# Patient Record
Sex: FEMALE | Race: WHITE | Employment: OTHER | URBAN - METROPOLITAN AREA
[De-identification: names, ages, dates, MRNs, and addresses within clinical notes are randomized per-mention and may not be internally consistent; named-entity substitution may affect disease eponyms.]

---

## 2018-11-05 ENCOUNTER — HOSPITAL ENCOUNTER (OUTPATIENT)
Dept: MAMMOGRAPHY | Age: 73
Discharge: HOME | End: 2018-11-05
Payer: MEDICARE

## 2018-11-05 DIAGNOSIS — M85.89 OTHER SPECIFIED DISORDERS OF BONE DENSITY AND STRUCTURE, MULTIPLE SITES: ICD-10-CM

## 2018-11-05 PROCEDURE — 77080 DXA BONE DENSITY AXIAL: CPT

## 2020-02-28 ENCOUNTER — APPOINTMENT (OUTPATIENT)
Dept: GENERAL RADIOLOGY | Age: 75
End: 2020-02-28
Attending: EMERGENCY MEDICINE
Payer: MEDICARE

## 2020-02-28 ENCOUNTER — APPOINTMENT (OUTPATIENT)
Dept: CT IMAGING | Age: 75
End: 2020-02-28
Attending: EMERGENCY MEDICINE
Payer: MEDICARE

## 2020-02-28 ENCOUNTER — HOSPITAL ENCOUNTER (EMERGENCY)
Age: 75
Discharge: HOME | End: 2020-02-28
Attending: EMERGENCY MEDICINE | Admitting: EMERGENCY MEDICINE
Payer: MEDICARE

## 2020-02-28 ENCOUNTER — APPOINTMENT (OUTPATIENT)
Dept: VASCULAR SURGERY | Age: 75
End: 2020-02-28
Attending: EMERGENCY MEDICINE
Payer: MEDICARE

## 2020-02-28 VITALS
TEMPERATURE: 97.8 F | HEART RATE: 66 BPM | RESPIRATION RATE: 16 BRPM | SYSTOLIC BLOOD PRESSURE: 119 MMHG | DIASTOLIC BLOOD PRESSURE: 74 MMHG | OXYGEN SATURATION: 98 %

## 2020-02-28 DIAGNOSIS — M79.651 RIGHT THIGH PAIN: Primary | ICD-10-CM

## 2020-02-28 LAB
ALBUMIN SERPL-MCNC: 4 G/DL (ref 3.5–5)
ALBUMIN/GLOB SERPL: 1 {RATIO} (ref 1.1–2.2)
ALP SERPL-CCNC: 83 U/L (ref 45–117)
ALT SERPL-CCNC: 16 U/L (ref 12–78)
ANION GAP SERPL CALC-SCNC: 8 MMOL/L (ref 5–15)
AST SERPL-CCNC: 18 U/L (ref 15–37)
BILIRUB SERPL-MCNC: 0.5 MG/DL (ref 0.2–1)
BUN SERPL-MCNC: 18 MG/DL (ref 6–20)
BUN/CREAT SERPL: 18 (ref 12–20)
CALCIUM SERPL-MCNC: 9.2 MG/DL (ref 8.5–10.1)
CHLORIDE SERPL-SCNC: 108 MMOL/L (ref 97–108)
CO2 SERPL-SCNC: 24 MMOL/L (ref 21–32)
COMMENT, HOLDF: NORMAL
CREAT SERPL-MCNC: 1 MG/DL (ref 0.55–1.02)
GLOBULIN SER CALC-MCNC: 4 G/DL (ref 2–4)
GLUCOSE SERPL-MCNC: 90 MG/DL (ref 65–100)
POTASSIUM SERPL-SCNC: 3.8 MMOL/L (ref 3.5–5.1)
PROT SERPL-MCNC: 8 G/DL (ref 6.4–8.2)
SAMPLES BEING HELD,HOLD: NORMAL
SODIUM SERPL-SCNC: 140 MMOL/L (ref 136–145)

## 2020-02-28 PROCEDURE — 99283 EMERGENCY DEPT VISIT LOW MDM: CPT

## 2020-02-28 PROCEDURE — 74011000258 HC RX REV CODE- 258: Performed by: RADIOLOGY

## 2020-02-28 PROCEDURE — 74011636320 HC RX REV CODE- 636/320: Performed by: RADIOLOGY

## 2020-02-28 PROCEDURE — 93971 EXTREMITY STUDY: CPT

## 2020-02-28 PROCEDURE — 80053 COMPREHEN METABOLIC PANEL: CPT

## 2020-02-28 PROCEDURE — 74011250637 HC RX REV CODE- 250/637: Performed by: EMERGENCY MEDICINE

## 2020-02-28 PROCEDURE — 73502 X-RAY EXAM HIP UNI 2-3 VIEWS: CPT

## 2020-02-28 PROCEDURE — 73701 CT LOWER EXTREMITY W/DYE: CPT

## 2020-02-28 PROCEDURE — 36415 COLL VENOUS BLD VENIPUNCTURE: CPT

## 2020-02-28 RX ORDER — SODIUM CHLORIDE 0.9 % (FLUSH) 0.9 %
10 SYRINGE (ML) INJECTION
Status: COMPLETED | OUTPATIENT
Start: 2020-02-28 | End: 2020-02-28

## 2020-02-28 RX ORDER — KETOROLAC TROMETHAMINE 10 MG/1
10 TABLET, FILM COATED ORAL
Qty: 20 TAB | Refills: 0 | Status: SHIPPED | OUTPATIENT
Start: 2020-02-28

## 2020-02-28 RX ORDER — TRAMADOL HYDROCHLORIDE 50 MG/1
50 TABLET ORAL
Status: COMPLETED | OUTPATIENT
Start: 2020-02-28 | End: 2020-02-28

## 2020-02-28 RX ADMIN — IOPAMIDOL 100 ML: 612 INJECTION, SOLUTION INTRAVENOUS at 13:07

## 2020-02-28 RX ADMIN — Medication 10 ML: at 13:07

## 2020-02-28 RX ADMIN — SODIUM CHLORIDE 100 ML: 900 INJECTION, SOLUTION INTRAVENOUS at 13:07

## 2020-02-28 RX ADMIN — TRAMADOL HYDROCHLORIDE 50 MG: 50 TABLET, FILM COATED ORAL at 09:53

## 2020-02-28 NOTE — ED PROVIDER NOTES
76 y.o. female with past medical history significant for arthritis who presents from home with chief complaint of groin pain. Patient states ~1 week ago she \"felt a tinge\" in her right hip/groin while working in her yard. Patient states her right hip/groin began to hurt however she \"tried to work it out in the gym,\" reporting her pain subsided mildly. Patient states yesterday her right hip/groin pain returned, noting the pain was most severe in her right groin area. Patient states Sherine Nettles was unable to ambulate yesterday or left her right leg\" and \"unable to sleep last night\" secondary to her pain. Patient presents to [facility] ED for evaluation of right groin pain endorsing accompanying right hip pain that is \"less severe. \" Patient denies hx hernia. Patient denies numbness, abdominal pain, back pain, and SOB. There are no other acute medical concerns at this time. Social hx: No Tobacco use, (+) EtOH use, No Illicit drug use. PCP: Oanh Siddiqi MD    Note written by Reggie Styles, as dictated by Zoraida Pacheco MD 6:45 AM     The history is provided by the patient. No  was used.         Past Medical History:   Diagnosis Date    Arthritis     Arthropathy     Cancer ([facility] [address] 2002    Breast    Osteoporosis     Vertigo        Past Surgical History:   Procedure Laterality Date    BREAST SURGERY PROCEDURE UNLISTED      lumpectomy    HX ORTHOPAEDIC           Family History:   Problem Relation Age of Onset    Osteoporosis Mother     Broken Bones Father        Social History     Socioeconomic History    Marital status:      Spouse name: Not on file    Number of children: Not on file    Years of education: Not on file    Highest education level: Not on file   Occupational History    Not on file   Social Needs    Financial resource strain: Not on file    Food insecurity:     Worry: Not on file     Inability: Not on file    Transportation needs:     Medical: Not on file Non-medical: Not on file   Tobacco Use    Smoking status: Never Smoker    Smokeless tobacco: Never Used   Substance and Sexual Activity    Alcohol use: Yes     Frequency: Never     Comment: 1 glass of wine a month    Drug use: Not on file    Sexual activity: Not on file   Lifestyle    Physical activity:     Days per week: Not on file     Minutes per session: Not on file    Stress: Not on file   Relationships    Social connections:     Talks on phone: Not on file     Gets together: Not on file     Attends Mandaeism service: Not on file     Active member of club or organization: Not on file     Attends meetings of clubs or organizations: Not on file     Relationship status: Not on file    Intimate partner violence:     Fear of current or ex partner: Not on file     Emotionally abused: Not on file     Physically abused: Not on file     Forced sexual activity: Not on file   Other Topics Concern    Not on file   Social History Narrative    Not on file         ALLERGIES: Patient has no known allergies. Review of Systems   Constitutional: Negative for activity change, appetite change and fatigue. HENT: Negative for ear pain, facial swelling, sore throat and trouble swallowing. Eyes: Negative for pain, discharge and visual disturbance. Respiratory: Negative for chest tightness, shortness of breath and wheezing. Cardiovascular: Negative for chest pain and palpitations. Gastrointestinal: Negative for abdominal pain, blood in stool, nausea and vomiting. Genitourinary: Negative for difficulty urinating, flank pain and hematuria. Musculoskeletal: Positive for gait problem. Negative for arthralgias, back pain, joint swelling, myalgias and neck pain. Right groin pain, right hip pain. Skin: Negative for color change and rash. Neurological: Negative for dizziness, weakness, numbness and headaches. Hematological: Negative for adenopathy. Does not bruise/bleed easily. Psychiatric/Behavioral: Positive for sleep disturbance. Negative for behavioral problems and confusion. All other systems reviewed and are negative. Vitals:    02/28/20 0547   BP: 119/74   Pulse: 66   Resp: 16   Temp: 97.8 °F (36.6 °C)   SpO2: 98%            Physical Exam  Vitals signs and nursing note reviewed. Constitutional:       General: She is not in acute distress. Appearance: She is well-developed. HENT:      Head: Normocephalic and atraumatic. Nose: Nose normal.   Eyes:      General: No scleral icterus. Conjunctiva/sclera: Conjunctivae normal.      Pupils: Pupils are equal, round, and reactive to light. Neck:      Musculoskeletal: Normal range of motion and neck supple. Thyroid: No thyromegaly. Vascular: No JVD. Trachea: No tracheal deviation. Comments: No carotid bruits noted. Cardiovascular:      Rate and Rhythm: Normal rate and regular rhythm. Heart sounds: Normal heart sounds. No murmur. No friction rub. No gallop. Pulmonary:      Effort: Pulmonary effort is normal. No respiratory distress. Breath sounds: Normal breath sounds. No wheezing or rales. Chest:      Chest wall: No tenderness. Abdominal:      General: Bowel sounds are normal. There is no distension. Palpations: Abdomen is soft. There is no mass. Tenderness: There is no abdominal tenderness. There is no guarding or rebound. Musculoskeletal:      Comments: Discomfort over right greater trochanter. Tenderness to right side of groin. Tenderness over right femoral artery. Lymphadenopathy:      Cervical: No cervical adenopathy. Skin:     General: Skin is warm and dry. Findings: No erythema or rash. Neurological:      Mental Status: She is alert and oriented to person, place, and time. Cranial Nerves: No cranial nerve deficit. Coordination: Coordination normal.      Deep Tendon Reflexes: Reflexes are normal and symmetric.    Psychiatric: Behavior: Behavior normal.         Thought Content: Thought content normal.         Judgment: Judgment normal.     Note written by Carol Nathan, as dictated by Yin Rodriguez MD 6:45 AM      MDM  Number of Diagnoses or Management Options  Right thigh pain: new and requires workup     Amount and/or Complexity of Data Reviewed  Clinical lab tests: reviewed and ordered  Tests in the radiology section of CPT®: ordered and reviewed  Decide to obtain previous medical records or to obtain history from someone other than the patient: yes  Obtain history from someone other than the patient: yes  Review and summarize past medical records: yes  Discuss the patient with other providers: yes  Independent visualization of images, tracings, or specimens: yes    Risk of Complications, Morbidity, and/or Mortality  Presenting problems: moderate  Diagnostic procedures: high  Management options: moderate    Patient Progress  Patient progress: stable         Procedures  PROGRESS NOTE:  7:47 AM  Upon re-examination pt has no inguinal pain and no hernia, when palpating over femoral artery pt has tenderness but when moving laterally over femoral vein pt is very tender. Questionable clot or occulsive process over femoral vein, will order duplex, pt understands and agrees with plan. The Doppler study and plain x-rays of the hip and pelvis appear to be unremarkable. 9:39 AM  Provider spoke to Ortho who will come see pt and recommends CT of pt's hip. Ortho will see the patient following the hip CT. 11:05 AM  Pt reports Tramadol has helped her sx, she is able to get up now and move around. Following the CT, the patient is seen by orthopedics. There is no acute abnormality noted on exam.  He agrees that the presentation appears to be more muscular or connective tissue in origin. He agrees with symptomatic treatment with tramadol that was given here and made her feel better, rest, warm compresses and minimized activity. She will use a walker to help minimize weightbearing and further stress on the right leg. Patient will follow-up with her primary physician or with orthopedics if she continues to have difficulty. She voices understanding and is discharged home in no acute distress with her . The degree of work-up was mandated by the patient's severity of symptoms. This is a patient who is normally very active and does not complain of pain and was in excruciating discomfort, being unable to move her leg without significant pain.

## 2020-02-28 NOTE — DISCHARGE INSTRUCTIONS
Use the medications as directed. Minimize weightbearing. Use a walker to help stabilize yourself and help minimize weight when you are up moving around. Warm moist compresses to the area 4 times a day for 20 to 30 minutes at a time. As you were told by Ortho, make arrangements to see either Dr. Suyapa Ma or Dr. Zion Howard on Monday. Call them sooner if any worsening of symptoms.

## 2020-02-28 NOTE — ED TRIAGE NOTES
Patient arrives from home with CC of right hip pain since Sunday after working in the garden. Pt reports pain has worsened since then. Pain is worse with flexion and sitting. Can only find relief when straightening leg. Pt took Naproxen, used a heating pad and placed a lidocaine patch on hip with no relief.

## 2020-02-28 NOTE — CONSULTS
ORTHO CONSULT NOTE    Subjective:     Date of Consultation:  February 28, 2020  Referring Physician:  Paty Caro is a 76 y.o. female who is being seen for right hip/thigh pain. She believes she may have \"tweeked\" her hip while gardening a week ago. It bothered her throughout the week but was most severe yesterday. She had difficulty walking and was unable to sleep last night due to pain. Primarily she has right groin and anterior thigh pain. Pain is worsened by active hip flexion and better with the hip and knee both extended. Xrays were negative. CT showed small amount of fluid in the right hip joint. Vascular studies were normal. She reports ongoing pain but significant improvement with a dose of Toradol. Denies back pain or radiculopathy type symptoms. Family History   Problem Relation Age of Onset    Osteoporosis Mother     Broken Bones Father       Social History     Tobacco Use    Smoking status: Never Smoker    Smokeless tobacco: Never Used   Substance Use Topics    Alcohol use: Yes     Frequency: Never     Comment: 1 glass of wine a month     Past Medical History:   Diagnosis Date    Arthritis     Arthropathy     Cancer ([facility] [address] 2002    Breast    Osteoporosis     Vertigo       Past Surgical History:   Procedure Laterality Date    BREAST SURGERY PROCEDURE UNLISTED      lumpectomy    HX ORTHOPAEDIC        Prior to Admission medications    Medication Sig Start Date End Date Taking? Authorizing Provider   ketorolac (TORADOL) 10 mg tablet Take 1 Tab by mouth every six (6) hours as needed for Pain. 2/28/20  Yes Letty Rockwell MD     No current facility-administered medications for this encounter. Current Outpatient Medications   Medication Sig    ketorolac (TORADOL) 10 mg tablet Take 1 Tab by mouth every six (6) hours as needed for Pain. No Known Allergies     Review of Systems:  A comprehensive review of systems was negative except for that written in the HPI.     Mental Status: no dementia    Objective:     No data found. Temp (24hrs), Av.8 °F (36.6 °C), Min:97.8 °F (36.6 °C), Max:97.8 °F (36.6 °C)      PHYSICAL EXAM:   General: 66yo female, pleasant/cooperative, obviously uncomfortable, spouse at bedside  CNS: alert/oriented, normal mood  Skin: no rash or abnormalities right LE  Extremities: sitting on edge of bed with right leg straight, unable to actively flex hip without severe pain, exquisitely tender over proximal quadriceps, no palpable deformity, unable to tolerate passive right hip ROM  Vascular: strong pedal pulse  Neurologic: 5/5 strength with flex/ext of knee and ankle, unable to flex hip due to pain, sensation intact throughout LE    Imaging Review:   XR HIP RT W OR WO PELV 2-3 VWS 2020 07:21  INDICATION: Trauma  EXAMINATION:  HIP RADIOGRAPHS  COMPARISON: None  FINDINGS:  Single AP view of the pelvis and lateral view of the right hip demonstrate no  acute fracture or dislocation. The pubic symphysis is intact. The sacroiliac  joints are intact. IMPRESSION:  No acute process. CT LOW EXT RT W CONT 2020 13:31  INDICATION: Severe right thigh and hip pain is increasing since working in the  garden 5 days ago. COMPARISON: No previous cross-sectional imaging of the right thigh or hip. CONTRAST: 100 mL of Isovue-300. TECHNIQUE: Helical CT of the right lower extremity from the iliac crest to the  proximal tibia during uneventful rapid bolus intravenous contrast  administration. Coronal and Sagittal reformats were generated. Images reviewed  in soft tissue and bone windows. CT dose reduction was achieved through the use  of a standardized protocol tailored for this examination and automatic exposure  control for dose modulation. FINDINGS: Bones: Mild osteopenia. No fracture, dislocation, or periosteal  reaction. No aggressive bone lesion. Joint fluid: Small right hip joint effusion. No gas. Articulations: Mild right hip osteoarthritis.  Right hip osteoarthritis is  moderate in the lateral compartment. Tendons: Right hamstring origin calcific tendinosis. Extensor mechanism is  intact at the knee. Muscles: No intramuscular hematoma. No focal atrophy. Soft tissue mass: No enhancing mass. No drainable fluid collection. No acute  process within the pelvis. Image arteries are patent. Limited opacification of  the veins due to timing of contrast bolus. IMPRESSION:   1. Nonspecific small right hip joint effusion. Differential diagnosis is  reactive effusion due to mild osteoarthritis versus inflammatory synovitis due  to recent activity more likely than septic arthritis. Correlate with laboratory  values. 2. No fracture. No CT evidence of osteomyelitis    Labs:   Recent Results (from the past 24 hour(s))   SAMPLES BEING HELD    Collection Time: 02/28/20 11:45 AM   Result Value Ref Range    SAMPLES BEING HELD 1RED 1BLU 1LAV     COMMENT        Add-on orders for these samples will be processed based on acceptable specimen integrity and analyte stability, which may vary by analyte. METABOLIC PANEL, COMPREHENSIVE    Collection Time: 02/28/20 11:45 AM   Result Value Ref Range    Sodium 140 136 - 145 mmol/L    Potassium 3.8 3.5 - 5.1 mmol/L    Chloride 108 97 - 108 mmol/L    CO2 24 21 - 32 mmol/L    Anion gap 8 5 - 15 mmol/L    Glucose 90 65 - 100 mg/dL    BUN 18 6 - 20 MG/DL    Creatinine 1.00 0.55 - 1.02 MG/DL    BUN/Creatinine ratio 18 12 - 20      GFR est AA >60 >60 ml/min/1.73m2    GFR est non-AA 54 (L) >60 ml/min/1.73m2    Calcium 9.2 8.5 - 10.1 MG/DL    Bilirubin, total 0.5 0.2 - 1.0 MG/DL    ALT (SGPT) 16 12 - 78 U/L    AST (SGOT) 18 15 - 37 U/L    Alk.  phosphatase 83 45 - 117 U/L    Protein, total 8.0 6.4 - 8.2 g/dL    Albumin 4.0 3.5 - 5.0 g/dL    Globulin 4.0 2.0 - 4.0 g/dL    A-G Ratio 1.0 (L) 1.1 - 2.2           Impression:     Right thigh quadriceps/hip flexor strain    Plan:     Symptoms and exam most consistent with soft tissue muscle/tendon type injury. Although she is still having noticeable discomfort, she reports significant improvement with one dose of Toradol. We discussed treatment options and further work-up. Suspicion for fracture very low but could get MRI. Suspicion for septic joint is very low but would need aspiration to rule out completely. I gave her the option of admission for MRI and possible hip joint aspiration. She would like to treat this conservatively for now. Recommend continued Toradol along with offloading the hip with a walker. She is in agreement with this plan. She understands that if her symptoms worsen she if to return to ER over the weekend. Otherwise, she can follow-up as outpatient with [facility].     PEG Max

## 2021-03-18 ENCOUNTER — TRANSCRIBE ORDER (OUTPATIENT)
Dept: SCHEDULING | Age: 76
End: 2021-03-18

## 2021-03-18 DIAGNOSIS — Z79.83 LONG TERM (CURRENT) USE OF BISPHOSPHONATES: Primary | ICD-10-CM

## 2021-03-18 DIAGNOSIS — M85.80 OSTEOPENIA: Primary | ICD-10-CM

## 2021-03-29 ENCOUNTER — HOSPITAL ENCOUNTER (OUTPATIENT)
Dept: MAMMOGRAPHY | Age: 76
Discharge: HOME | End: 2021-03-29
Attending: INTERNAL MEDICINE
Payer: MEDICARE

## 2021-03-29 DIAGNOSIS — Z79.83 LONG TERM (CURRENT) USE OF BISPHOSPHONATES: ICD-10-CM

## 2021-03-29 PROCEDURE — 77080 DXA BONE DENSITY AXIAL: CPT

## 2021-06-29 ENCOUNTER — TRANSCRIBE ORDER (OUTPATIENT)
Dept: SCHEDULING | Age: 76
End: 2021-06-29

## 2021-06-29 DIAGNOSIS — M54.9 BACK PAIN: Primary | ICD-10-CM

## 2021-07-14 ENCOUNTER — HOSPITAL ENCOUNTER (OUTPATIENT)
Dept: MRI IMAGING | Age: 76
Discharge: HOME | End: 2021-07-14
Attending: ORTHOPAEDIC SURGERY
Payer: MEDICARE

## 2021-07-14 DIAGNOSIS — M54.9 BACK PAIN: ICD-10-CM

## 2021-07-14 PROCEDURE — 72148 MRI LUMBAR SPINE W/O DYE: CPT

## 2021-07-20 ENCOUNTER — TRANSCRIBE ORDER (OUTPATIENT)
Dept: SCHEDULING | Age: 76
End: 2021-07-20

## 2021-07-20 DIAGNOSIS — M25.561 RIGHT KNEE PAIN: Primary | ICD-10-CM

## 2021-07-26 ENCOUNTER — HOSPITAL ENCOUNTER (OUTPATIENT)
Dept: MRI IMAGING | Age: 76
Discharge: HOME | End: 2021-07-26
Attending: ORTHOPAEDIC SURGERY
Payer: MEDICARE

## 2021-07-26 DIAGNOSIS — M25.561 RIGHT KNEE PAIN: ICD-10-CM

## 2021-07-26 PROCEDURE — 73721 MRI JNT OF LWR EXTRE W/O DYE: CPT

## 2023-05-11 RX ORDER — KETOROLAC TROMETHAMINE 10 MG/1
TABLET, FILM COATED ORAL EVERY 6 HOURS PRN
COMMUNITY
Start: 2020-02-28

## 2024-07-10 ENCOUNTER — HOSPITAL ENCOUNTER (OUTPATIENT)
Facility: HOSPITAL | Age: 79
Discharge: HOME | End: 2024-07-13
Payer: MEDICARE

## 2024-07-10 DIAGNOSIS — R19.00 PALPABLE ABDOMINAL MASS: ICD-10-CM

## 2024-07-10 DIAGNOSIS — I70.0 AORTIC CALCIFICATION (HCC): ICD-10-CM

## 2024-07-10 DIAGNOSIS — Z13.6 SCREENING FOR AAA (ABDOMINAL AORTIC ANEURYSM): ICD-10-CM

## 2024-07-10 DIAGNOSIS — Z87.891 HISTORY OF SMOKING: ICD-10-CM

## 2024-07-10 PROCEDURE — 76706 US ABDL AORTA SCREEN AAA: CPT

## 2024-09-18 ENCOUNTER — HOSPITAL ENCOUNTER (OUTPATIENT)
Facility: HOSPITAL | Age: 79
Discharge: HOME | End: 2024-09-21
Payer: MEDICARE

## 2024-09-18 DIAGNOSIS — M51.36 DDD (DEGENERATIVE DISC DISEASE), LUMBAR: ICD-10-CM

## 2024-09-18 DIAGNOSIS — M99.03 SOMATIC DYSFUNCTION OF LUMBAR REGION: ICD-10-CM

## 2024-09-18 DIAGNOSIS — M54.50 RIGHT LOW BACK PAIN, UNSPECIFIED CHRONICITY, UNSPECIFIED WHETHER SCIATICA PRESENT: ICD-10-CM

## 2024-09-18 DIAGNOSIS — M47.26 OTHER SPONDYLOSIS WITH RADICULOPATHY, LUMBAR REGION: ICD-10-CM

## 2024-09-18 PROCEDURE — 72148 MRI LUMBAR SPINE W/O DYE: CPT

## 2024-11-22 ENCOUNTER — HOSPITAL ENCOUNTER (EMERGENCY)
Facility: HOSPITAL | Age: 79
Discharge: HOME | End: 2024-11-22
Attending: STUDENT IN AN ORGANIZED HEALTH CARE EDUCATION/TRAINING PROGRAM
Payer: MEDICARE

## 2024-11-22 ENCOUNTER — APPOINTMENT (OUTPATIENT)
Facility: HOSPITAL | Age: 79
End: 2024-11-22
Payer: MEDICARE

## 2024-11-22 VITALS
RESPIRATION RATE: 16 BRPM | WEIGHT: 132 LBS | BODY MASS INDEX: 21.21 KG/M2 | HEART RATE: 73 BPM | OXYGEN SATURATION: 98 % | TEMPERATURE: 96.8 F | HEIGHT: 66 IN | DIASTOLIC BLOOD PRESSURE: 70 MMHG | SYSTOLIC BLOOD PRESSURE: 123 MMHG

## 2024-11-22 DIAGNOSIS — M54.2 NECK PAIN: ICD-10-CM

## 2024-11-22 DIAGNOSIS — R42 DIZZINESS: Primary | ICD-10-CM

## 2024-11-22 LAB
ALBUMIN SERPL-MCNC: 3.9 G/DL (ref 3.5–5)
ALBUMIN/GLOB SERPL: 0.9 (ref 1.1–2.2)
ALP SERPL-CCNC: 95 U/L (ref 45–117)
ALT SERPL-CCNC: 17 U/L (ref 12–78)
ANION GAP SERPL CALC-SCNC: 15 MMOL/L (ref 2–12)
AST SERPL-CCNC: 26 U/L (ref 15–37)
BASOPHILS # BLD: 0 K/UL (ref 0–0.1)
BASOPHILS NFR BLD: 0 % (ref 0–1)
BILIRUB SERPL-MCNC: 0.7 MG/DL (ref 0.2–1)
BUN SERPL-MCNC: 11 MG/DL (ref 6–20)
BUN/CREAT SERPL: 11 (ref 12–20)
CALCIUM SERPL-MCNC: 10 MG/DL (ref 8.5–10.1)
CHLORIDE SERPL-SCNC: 98 MMOL/L (ref 97–108)
CO2 SERPL-SCNC: 21 MMOL/L (ref 21–32)
CREAT SERPL-MCNC: 0.98 MG/DL (ref 0.55–1.02)
DIFFERENTIAL METHOD BLD: ABNORMAL
EKG ATRIAL RATE: 79 BPM
EKG DIAGNOSIS: NORMAL
EKG P AXIS: 68 DEGREES
EKG P-R INTERVAL: 154 MS
EKG Q-T INTERVAL: 404 MS
EKG QRS DURATION: 88 MS
EKG QTC CALCULATION (BAZETT): 463 MS
EKG R AXIS: 33 DEGREES
EKG T AXIS: 20 DEGREES
EKG VENTRICULAR RATE: 79 BPM
EOSINOPHIL # BLD: 0 K/UL (ref 0–0.4)
EOSINOPHIL NFR BLD: 0 % (ref 0–7)
ERYTHROCYTE [DISTWIDTH] IN BLOOD BY AUTOMATED COUNT: 12.9 % (ref 11.5–14.5)
GLOBULIN SER CALC-MCNC: 4.2 G/DL (ref 2–4)
GLUCOSE SERPL-MCNC: 117 MG/DL (ref 65–100)
HCT VFR BLD AUTO: 39.7 % (ref 35–47)
HGB BLD-MCNC: 13.7 G/DL (ref 11.5–16)
IMM GRANULOCYTES # BLD AUTO: 0 K/UL (ref 0–0.04)
IMM GRANULOCYTES NFR BLD AUTO: 0 % (ref 0–0.5)
LYMPHOCYTES # BLD: 0.7 K/UL (ref 0.8–3.5)
LYMPHOCYTES NFR BLD: 7 % (ref 12–49)
MCH RBC QN AUTO: 31.6 PG (ref 26–34)
MCHC RBC AUTO-ENTMCNC: 34.5 G/DL (ref 30–36.5)
MCV RBC AUTO: 91.5 FL (ref 80–99)
MONOCYTES # BLD: 0.9 K/UL (ref 0–1)
MONOCYTES NFR BLD: 9 % (ref 5–13)
NEUTS SEG # BLD: 8.2 K/UL (ref 1.8–8)
NEUTS SEG NFR BLD: 84 % (ref 32–75)
NRBC # BLD: 0 K/UL (ref 0–0.01)
NRBC BLD-RTO: 0 PER 100 WBC
PLATELET # BLD AUTO: 297 K/UL (ref 150–400)
PMV BLD AUTO: 10.2 FL (ref 8.9–12.9)
POTASSIUM SERPL-SCNC: 4.3 MMOL/L (ref 3.5–5.1)
PROT SERPL-MCNC: 8.1 G/DL (ref 6.4–8.2)
RBC # BLD AUTO: 4.34 M/UL (ref 3.8–5.2)
RBC MORPH BLD: ABNORMAL
SODIUM SERPL-SCNC: 134 MMOL/L (ref 136–145)
WBC # BLD AUTO: 9.8 K/UL (ref 3.6–11)

## 2024-11-22 PROCEDURE — 99285 EMERGENCY DEPT VISIT HI MDM: CPT

## 2024-11-22 PROCEDURE — 70450 CT HEAD/BRAIN W/O DYE: CPT

## 2024-11-22 PROCEDURE — 6360000004 HC RX CONTRAST MEDICATION: Performed by: STUDENT IN AN ORGANIZED HEALTH CARE EDUCATION/TRAINING PROGRAM

## 2024-11-22 PROCEDURE — 36415 COLL VENOUS BLD VENIPUNCTURE: CPT

## 2024-11-22 PROCEDURE — 6370000000 HC RX 637 (ALT 250 FOR IP)

## 2024-11-22 PROCEDURE — 80053 COMPREHEN METABOLIC PANEL: CPT

## 2024-11-22 PROCEDURE — 93005 ELECTROCARDIOGRAM TRACING: CPT

## 2024-11-22 PROCEDURE — 70498 CT ANGIOGRAPHY NECK: CPT

## 2024-11-22 PROCEDURE — 85025 COMPLETE CBC W/AUTO DIFF WBC: CPT

## 2024-11-22 RX ORDER — METHOCARBAMOL 750 MG/1
750 TABLET, FILM COATED ORAL 4 TIMES DAILY
Qty: 20 TABLET | Refills: 0 | Status: SHIPPED | OUTPATIENT
Start: 2024-11-22 | End: 2024-11-22

## 2024-11-22 RX ORDER — NAPROXEN 500 MG/1
500 TABLET ORAL 2 TIMES DAILY
Qty: 40 TABLET | Refills: 0 | Status: SHIPPED | OUTPATIENT
Start: 2024-11-22 | End: 2024-11-22

## 2024-11-22 RX ORDER — IOPAMIDOL 755 MG/ML
100 INJECTION, SOLUTION INTRAVASCULAR
Status: COMPLETED | OUTPATIENT
Start: 2024-11-22 | End: 2024-11-22

## 2024-11-22 RX ORDER — LIDOCAINE 50 MG/G
1 PATCH TOPICAL DAILY
Qty: 10 PATCH | Refills: 0 | Status: SHIPPED | OUTPATIENT
Start: 2024-11-22 | End: 2024-11-22

## 2024-11-22 RX ORDER — ACETAMINOPHEN 500 MG
1000 TABLET ORAL ONCE
Status: COMPLETED | OUTPATIENT
Start: 2024-11-22 | End: 2024-11-22

## 2024-11-22 RX ORDER — METHOCARBAMOL 500 MG/1
1000 TABLET, FILM COATED ORAL
Status: COMPLETED | OUTPATIENT
Start: 2024-11-22 | End: 2024-11-22

## 2024-11-22 RX ORDER — LIDOCAINE 50 MG/G
1 PATCH TOPICAL DAILY
Qty: 10 PATCH | Refills: 0 | Status: SHIPPED | OUTPATIENT
Start: 2024-11-22 | End: 2024-12-02

## 2024-11-22 RX ORDER — LIDOCAINE 4 G/G
1 PATCH TOPICAL
Status: DISCONTINUED | OUTPATIENT
Start: 2024-11-22 | End: 2024-11-22 | Stop reason: HOSPADM

## 2024-11-22 RX ORDER — METHOCARBAMOL 750 MG/1
750 TABLET, FILM COATED ORAL 4 TIMES DAILY
Qty: 20 TABLET | Refills: 0 | Status: SHIPPED | OUTPATIENT
Start: 2024-11-22 | End: 2024-11-27

## 2024-11-22 RX ORDER — NAPROXEN 500 MG/1
500 TABLET ORAL 2 TIMES DAILY
Qty: 40 TABLET | Refills: 0 | Status: SHIPPED | OUTPATIENT
Start: 2024-11-22

## 2024-11-22 RX ADMIN — IOPAMIDOL 100 ML: 755 INJECTION, SOLUTION INTRAVENOUS at 14:20

## 2024-11-22 RX ADMIN — METHOCARBAMOL 1000 MG: 500 TABLET ORAL at 13:08

## 2024-11-22 RX ADMIN — ACETAMINOPHEN 1000 MG: 500 TABLET ORAL at 13:08

## 2024-11-22 ASSESSMENT — PAIN SCALES - GENERAL
PAINLEVEL_OUTOF10: 2
PAINLEVEL_OUTOF10: 1

## 2024-11-22 ASSESSMENT — PAIN DESCRIPTION - DESCRIPTORS: DESCRIPTORS: SHARP

## 2024-11-22 ASSESSMENT — PAIN DESCRIPTION - ORIENTATION: ORIENTATION: LEFT

## 2024-11-22 ASSESSMENT — PAIN DESCRIPTION - LOCATION: LOCATION: NECK

## 2024-11-22 NOTE — ED PROVIDER NOTES
11/22/24 12:57 PM   Result Value Ref Range    Ventricular Rate 79 BPM    Atrial Rate 79 BPM    P-R Interval 154 ms    QRS Duration 88 ms    Q-T Interval 404 ms    QTc Calculation (Bazett) 463 ms    P Axis 68 degrees    R Axis 33 degrees    T Axis 20 degrees    Diagnosis       Normal sinus rhythm  Nonspecific ST abnormality  Abnormal ECG  When compared with ECG of 14-APR-2009 09:48,  No significant change  Confirmed by Santosh Sanchez ([id number]) on 11/22/2024 2:03:49 PM     CBC with Auto Differential    Collection Time: 11/22/24  1:05 PM   Result Value Ref Range    WBC 9.8 3.6 - 11.0 K/uL    RBC 4.34 3.80 - 5.20 M/uL    Hemoglobin 13.7 11.5 - 16.0 g/dL    Hematocrit 39.7 35.0 - 47.0 %    MCV 91.5 80.0 - 99.0 FL    MCH 31.6 26.0 - 34.0 PG    MCHC 34.5 30.0 - 36.5 g/dL    RDW 12.9 11.5 - 14.5 %    Platelets 297 150 - 400 K/uL    MPV 10.2 8.9 - 12.9 FL    Nucleated RBCs 0.0 0  WBC    nRBC 0.00 0.00 - 0.01 K/uL    Neutrophils % 84 (H) 32 - 75 %    Lymphocytes % 7 (L) 12 - 49 %    Monocytes % 9 5 - 13 %    Eosinophils % 0 0 - 7 %    Basophils % 0 0 - 1 %    Immature Granulocytes % 0 0.0 - 0.5 %    Neutrophils Absolute 8.2 (H) 1.8 - 8.0 K/UL    Lymphocytes Absolute 0.7 (L) 0.8 - 3.5 K/UL    Monocytes Absolute 0.9 0.0 - 1.0 K/UL    Eosinophils Absolute 0.0 0.0 - 0.4 K/UL    Basophils Absolute 0.0 0.0 - 0.1 K/UL    Immature Granulocytes Absolute 0.0 0.00 - 0.04 K/UL    Differential Type SMEAR SCANNED      RBC Comment NORMOCYTIC, NORMOCHROMIC     Comprehensive Metabolic Panel    Collection Time: 11/22/24  1:05 PM   Result Value Ref Range    Sodium 134 (L) 136 - 145 mmol/L    Potassium 4.3 3.5 - 5.1 mmol/L    Chloride 98 97 - 108 mmol/L    CO2 21 21 - 32 mmol/L    Anion Gap 15 (H) 2 - 12 mmol/L    Glucose 117 (H) 65 - 100 mg/dL    BUN 11 6 - 20 MG/DL    Creatinine 0.98 0.55 - 1.02 MG/DL    BUN/Creatinine Ratio 11 (L) 12 - 20      Est, Glom Filt Rate 59 (L) >60 ml/min/1.73m2    Calcium 10.0 8.5 - 10.1 MG/DL    Total

## 2024-11-22 NOTE — ED TRIAGE NOTES
Patient arrives with c/o left sided neck pain since this morning. Patient reports she had a neck injury 15+ years ago after luggage fell on her head. Patient states she never had it checked and started having vertigo since. Patient had a vertigo episode Saturday and has been doing maneuvers for relief. Patient states she went to the bathroom this morning around 6am with severe neck pain. Patient is unsure if this is due to her accident years ago, the maneuvers she's been doing or something else. Denies taking anything for pain.